# Patient Record
Sex: MALE | Race: WHITE | NOT HISPANIC OR LATINO | URBAN - METROPOLITAN AREA
[De-identification: names, ages, dates, MRNs, and addresses within clinical notes are randomized per-mention and may not be internally consistent; named-entity substitution may affect disease eponyms.]

---
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
## 2021-02-25 ENCOUNTER — OFFICE VISIT (OUTPATIENT)
Dept: PEDIATRICS CLINIC | Facility: CLINIC | Age: 21
End: 2021-02-25
Payer: COMMERCIAL

## 2021-02-25 VITALS
HEART RATE: 82 BPM | BODY MASS INDEX: 33.6 KG/M2 | WEIGHT: 240 LBS | SYSTOLIC BLOOD PRESSURE: 120 MMHG | TEMPERATURE: 98.2 F | DIASTOLIC BLOOD PRESSURE: 72 MMHG | HEIGHT: 71 IN | RESPIRATION RATE: 18 BRPM

## 2021-02-25 DIAGNOSIS — Z00.01 ENCOUNTER FOR ROUTINE ADULT HEALTH EXAMINATION WITH ABNORMAL FINDINGS: Primary | ICD-10-CM

## 2021-02-25 DIAGNOSIS — R63.5 ABNORMAL WEIGHT GAIN: ICD-10-CM

## 2021-02-25 DIAGNOSIS — E66.9 OBESITY (BMI 30.0-34.9): ICD-10-CM

## 2021-02-25 DIAGNOSIS — Z11.3 SCREEN FOR STD (SEXUALLY TRANSMITTED DISEASE): ICD-10-CM

## 2021-02-25 DIAGNOSIS — Z23 NEED FOR VACCINATION: ICD-10-CM

## 2021-02-25 PROBLEM — Z72.820 LACK OF ADEQUATE SLEEP: Status: RESOLVED | Noted: 2019-06-03 | Resolved: 2021-02-25

## 2021-02-25 PROBLEM — Z00.121 ENCOUNTER FOR ROUTINE CHILD HEALTH EXAMINATION WITH ABNORMAL FINDINGS: Status: ACTIVE | Noted: 2019-06-03

## 2021-02-25 PROBLEM — J34.0 ABSCESS OF NOSE: Status: RESOLVED | Noted: 2020-08-28 | Resolved: 2021-02-25

## 2021-02-25 PROCEDURE — 90471 IMMUNIZATION ADMIN: CPT

## 2021-02-25 PROCEDURE — 90651 9VHPV VACCINE 2/3 DOSE IM: CPT

## 2021-02-25 PROCEDURE — 99395 PREV VISIT EST AGE 18-39: CPT | Performed by: PEDIATRICS

## 2021-02-25 PROCEDURE — 3008F BODY MASS INDEX DOCD: CPT | Performed by: PEDIATRICS

## 2021-02-25 PROCEDURE — 3725F SCREEN DEPRESSION PERFORMED: CPT | Performed by: PEDIATRICS

## 2021-02-25 PROCEDURE — 1036F TOBACCO NON-USER: CPT | Performed by: PEDIATRICS

## 2021-02-25 NOTE — PATIENT INSTRUCTIONS
Human Papillomavirus Vaccine (Cervarix, Gardasil, Gardasil 9) - (By injection)   Why this medicine is used:   Helps prevent genital warts and cancer of the anus, cervix, vagina, vulva, oropharyngeal (mouth and throat), or head and neck, which may be caused by human papillomavirus (HPV)  Contact a nurse or doctor right away if you have:  · Lightheadedness, dizziness, or fainting     Common side effects:  · Fever, headache, fatigue  © Copyright [employer] Information is for End User's use only and may not be sold, redistributed or otherwise used for commercial purposes

## 2021-02-25 NOTE — PROGRESS NOTES
Subjective:     Alana Edwards is a 24 y o  male who is brought in for this well child visit  History provided by: patient    Current Issues:  Current concerns: none  Well Child Assessment:  History provided by: The patient  Megan Hamm lives with his mother and sister  (No interval problems)     Nutrition  Food source: Regular diet  Dental  The patient has a dental home  The patient brushes teeth regularly  The patient flosses regularly  Elimination  (No elimination problems)   Sleep  The patient does not snore  There are no sleep problems  Safety  There is no smoking in the home  School  Grade level in school: In college, online studies  There are no signs of learning disabilities  Child is doing well in school  Screening  There are no risk factors for hearing loss  There are no risk factors for anemia  There are risk factors for dyslipidemia (Rapid weight gain)  There are no risk factors for vision problems  There are risk factors related to diet  There are risk factors for sexually transmitted infections (Sexually active, protected sex, last intercourse two months ago, No concerns with possible sexually transmitted diseases contact)  There are no risk factors related to alcohol  There are no risk factors related to emotions  There are no risk factors related to drugs  There are no risk factors related to tobacco    Social  Sibling interactions are good  The following portions of the patient's history were reviewed and updated as appropriate: allergies, current medications, past family history, past medical history, past social history, past surgical history and problem list           Objective:       Vitals:    02/25/21 1628   BP: 120/72   Pulse: 82   Resp: 18   Temp: 98 2 °F (36 8 °C)   TempSrc: Tympanic   Weight: 109 kg (240 lb)   Height: 5' 11" (1 803 m)     Growth parameters are noted and are not appropriate for age      Wt Readings from Last 1 Encounters:   02/25/21 109 kg (240 lb) (99 %, Z= 2 24)* * Growth percentiles are based on [facility] [place][note 2] (Boys, 2-20 Years) data  Ht Readings from Last 1 Encounters:   02/25/21 5' 11" (1 803 m) (69 %, Z= 0 50)*     * Growth percentiles are based on [facility] [place][note 2] (Boys, 2-20 Years) data  Body mass index is 33 47 kg/m²  Vitals:    02/25/21 1628   BP: 120/72   Pulse: 82   Resp: 18   Temp: 98 2 °F (36 8 °C)   TempSrc: Tympanic   Weight: 109 kg (240 lb)   Height: 5' 11" (1 803 m)        Hearing Screening    125Hz 250Hz 500Hz 1000Hz 2000Hz 3000Hz 4000Hz 6000Hz 8000Hz   Right ear:     25 25 25 25 25   Left ear:     25 25 25 25 25      Visual Acuity Screening    Right eye Left eye Both eyes   Without correction:   20/20   With correction:          Physical Exam  Vitals signs and nursing note reviewed  Constitutional:       General: He is not in acute distress  Appearance: Normal appearance  He is well-developed  He is not diaphoretic  HENT:      Head: Normocephalic and atraumatic  Right Ear: Tympanic membrane and external ear normal  No drainage  Left Ear: Tympanic membrane and external ear normal  No drainage  Nose: Nose normal  No nasal deformity  Mouth/Throat:      Pharynx: No oropharyngeal exudate or posterior oropharyngeal erythema  Tonsils: No tonsillar abscesses  Eyes:      General: Lids are normal  No scleral icterus  Right eye: No discharge  Left eye: No discharge  Conjunctiva/sclera: Conjunctivae normal       Pupils: Pupils are equal, round, and reactive to light  Neck:      Musculoskeletal: Normal range of motion and neck supple  Cardiovascular:      Rate and Rhythm: Normal rate and regular rhythm  Heart sounds: Normal heart sounds, S1 normal and S2 normal  No murmur  Pulmonary:      Effort: Pulmonary effort is normal  No respiratory distress  Breath sounds: Normal breath sounds  Abdominal:      General: Bowel sounds are normal       Palpations: Abdomen is soft   There is no hepatomegaly or splenomegaly  Tenderness: There is no abdominal tenderness  Genitourinary:     Comments:  Patient has no current complaints, exam deferred  Musculoskeletal: Normal range of motion  General: No tenderness or deformity  Comments:  No scoliosis   Lymphadenopathy:      Head:      Right side of head: No tonsillar adenopathy  Left side of head: No tonsillar adenopathy  Skin:     General: Skin is warm and dry  Findings: No rash  Rash is not pustular  Neurological:      Mental Status: He is alert and oriented to person, place, and time  Cranial Nerves: No cranial nerve deficit  Psychiatric:         Behavior: Behavior normal          Thought Content: Thought content normal          Judgment: Judgment normal            Assessment:     Well adolescent  1  Encounter for routine adult health examination with abnormal findings     2  Need for vaccination  HPV VACCINE 9 VALENT IM    CANCELED: influenza vaccine, quadrivalent, 0 5 mL, preservative-free, for adult and pediatric patients 6 mos+ (AFLURIA, FLUARIX, FLULAVAL, FLUZONE)   3  Obesity (BMI 30 0-34 9)     4  Abnormal weight gain  Lipid panel    Hemoglobin A1C   5  Screen for STD (sexually transmitted disease)  HIV 1/2 ANTIGEN/ANTIBODY (4TH GENERATION) W REFLEX [facility]        Plan:         1  Anticipatory guidance discussed  Specific topics reviewed: importance of regular dental care, importance of regular exercise, importance of varied diet, minimize junk food, puberty and sex; STD and pregnancy prevention  BMI Counseling: Body mass index is 33 47 kg/m²  The BMI is above normal  Nutrition recommendations include decreasing portion sizes, encouraging healthy choices of fruits and vegetables, limiting drinks that contain sugar, moderation in carbohydrate intake, increasing intake of lean protein and reducing intake of saturated and trans fat  Exercise recommendations include moderate physical activity 150 minutes/week   No pharmacotherapy was ordered  Labs ordered to evaluate metabolic status of the patient          2  Development: appropriate for age    1  Immunizations today: per orders  Vaccine Counseling: Discussed with: Ped parent/guardian: The patient  The benefits, contraindication and side effects for the following vaccines were reviewed: Immunization component list: Gardisil  Total number of components reveiwed:1    the patient refused influenza immunization  He is completely aware of the consequences of his decision    4  Follow-up visit in 1 year for next well child visit, or sooner as needed

## 2021-03-02 ENCOUNTER — APPOINTMENT (OUTPATIENT)
Dept: LAB | Facility: MEDICAL CENTER | Age: 21
End: 2021-03-02
Payer: COMMERCIAL

## 2021-03-02 ENCOUNTER — LAB (OUTPATIENT)
Dept: LAB | Facility: MEDICAL CENTER | Age: 21
End: 2021-03-02
Payer: COMMERCIAL

## 2021-03-02 DIAGNOSIS — Z11.3 SCREEN FOR STD (SEXUALLY TRANSMITTED DISEASE): ICD-10-CM

## 2021-03-02 DIAGNOSIS — R63.5 ABNORMAL WEIGHT GAIN: ICD-10-CM

## 2021-03-02 LAB
CHOLEST SERPL-MCNC: 181 MG/DL (ref 50–200)
HDLC SERPL-MCNC: 40 MG/DL
LDLC SERPL CALC-MCNC: 110 MG/DL (ref 0–100)
NONHDLC SERPL-MCNC: 141 MG/DL
TRIGL SERPL-MCNC: 157 MG/DL

## 2021-03-02 PROCEDURE — 80061 LIPID PANEL: CPT

## 2021-03-02 PROCEDURE — 36415 COLL VENOUS BLD VENIPUNCTURE: CPT

## 2021-03-02 PROCEDURE — 87389 HIV-1 AG W/HIV-1&-2 AB AG IA: CPT

## 2021-03-02 PROCEDURE — 83036 HEMOGLOBIN GLYCOSYLATED A1C: CPT

## 2021-03-03 ENCOUNTER — TELEPHONE (OUTPATIENT)
Dept: PEDIATRICS CLINIC | Facility: CLINIC | Age: 21
End: 2021-03-03

## 2021-03-03 LAB
EST. AVERAGE GLUCOSE BLD GHB EST-MCNC: 97 MG/DL
HBA1C MFR BLD: 5 %
HIV 1+2 AB+HIV1 P24 AG SERPL QL IA: NORMAL

## 2021-03-03 NOTE — TELEPHONE ENCOUNTER
----- Message from Laura Levy MD sent at 3/3/2021  2:26 PM EST -----   Triglycerides and LDL (bad cholesterol) are elevated slightly  Healthy lifestyle,  physical activity, healthy diet as recommended    Repeat labs in one year or as needed

## 2022-10-12 PROBLEM — Z00.01 ENCOUNTER FOR ROUTINE ADULT HEALTH EXAMINATION WITH ABNORMAL FINDINGS: Status: RESOLVED | Noted: 2019-06-03 | Resolved: 2022-10-12

## 2023-01-23 ENCOUNTER — OFFICE VISIT (OUTPATIENT)
Dept: SURGERY | Facility: CLINIC | Age: 23
End: 2023-01-23

## 2023-01-23 VITALS
BODY MASS INDEX: 32.6 KG/M2 | SYSTOLIC BLOOD PRESSURE: 100 MMHG | HEIGHT: 73 IN | TEMPERATURE: 97.7 F | OXYGEN SATURATION: 99 % | DIASTOLIC BLOOD PRESSURE: 60 MMHG | RESPIRATION RATE: 18 BRPM | HEART RATE: 72 BPM | WEIGHT: 246 LBS

## 2023-01-23 DIAGNOSIS — D17.1 LIPOMA OF SKIN OF ABDOMEN: Primary | ICD-10-CM

## 2023-01-23 NOTE — LETTER
January 23, 2023     [facility], [address] [place] [address],[address] [id number]    Patient: Henry Hodges   YOB: 2000   Date of Visit: 1/23/2023       Dear Dr Nancy Tejada: Thank you for referring Regina Other to me for evaluation  Below are my notes for this consultation  If you have questions, please do not hesitate to call me  I look forward to following your patient along with you  Sincerely,        Shilpi Fraga MD        CC: No Recipients  Shilpi Fraga MD  1/23/2023  9:33 AM  Sign when Signing Visit  Assessment/Plan:    Lipoma of skin of abdomen  Patient is a pleasant 76-year-old male presenting with a symptomatic lipoma in the right upper quadrant of his abdominal wall for which definitive treatment by excisional biopsy is now decayed  Additionally he notes a second lipoma on the posterior right flank  This is deep and would be difficult to excise in the office  Together we decided to not pursue excision in the office at the time of today's appointment  The technical details of excisional biopsy of the right upper quadrant abdominal wall lipoma reviewed in the presence of his mother  Specific risks related to local anesthesia bleeding infection and recurrence were all reviewed and informed verbal consent obtained to proceed  Subjective:     Patient ID: Henry Hodges is a 25 y o  male  Patient came in today for a lump on his right abd and back  Patient states the lumps only bother him when touched or pushed on  Patient is unsure how long he has these lumps but notice the lumps a few months ago  No fevers or chills Marya MACKEY MA      The following portions of the patient's history were reviewed and updated as appropriate: allergies, current medications, past family history, past medical history, past social history, past surgical history and problem list     Review of Systems   Constitutional: Negative for chills and fever     HENT: Negative for ear pain and sore throat  Eyes: Negative for pain and visual disturbance  Respiratory: Negative for cough and shortness of breath  Cardiovascular: Negative for chest pain and palpitations  Gastrointestinal: Negative for abdominal pain and vomiting  Genitourinary: Negative for dysuria and hematuria  Musculoskeletal: Negative for arthralgias and back pain  Skin: Negative for color change and rash  Neurological: Negative for seizures and syncope  All other systems reviewed and are negative  Objective:      /60 (BP Location: Left arm, Patient Position: Sitting, Cuff Size: Standard)   Pulse 72   Temp 97 7 °F (36 5 °C) (Temporal)   Resp 18   Ht 6' 1" (1 854 m)   Wt 112 kg (246 lb)   SpO2 99%   BMI 32 46 kg/m²           Physical Exam  Vitals and nursing note reviewed  Constitutional:       Appearance: He is well-developed  HENT:      Head: Normocephalic and atraumatic  Eyes:      Conjunctiva/sclera: Conjunctivae normal       Pupils: Pupils are equal, round, and reactive to light  Cardiovascular:      Rate and Rhythm: Normal rate and regular rhythm  Pulmonary:      Effort: Pulmonary effort is normal       Breath sounds: Normal breath sounds  Abdominal:      General: Bowel sounds are normal       Palpations: Abdomen is soft  Musculoskeletal:         General: Normal range of motion  Cervical back: Normal range of motion and neck supple  Skin:     General: Skin is warm and dry  Comments: 10 mm right upper quadrant abdominal wall lipoma   Neurological:      Mental Status: He is alert and oriented to person, place, and time  Psychiatric:         Behavior: Behavior normal          Thought Content: Thought content normal          Judgment: Judgment normal       Biopsy    Date/Time: 1/23/2023 9:32 AM  Performed by: Annie Plasencia MD  Authorized by: Annie Plasencia MD   Universal Protocol:  Consent: Verbal consent obtained    Risks and benefits: risks, benefits and alternatives were discussed  Consent given by: patient  Time out: Immediately prior to procedure a "time out" was called to verify the correct patient, procedure, equipment, support staff and site/side marked as required  Timeout called at: 1/23/2023 9:32 AM   Patient understanding: patient states understanding of the procedure being performed  Patient consent: the patient's understanding of the procedure matches consent given  Site marked: the operative site was marked  Patient identity confirmed: verbally with patient      Procedure Details - Lesion Biopsy:     Biopsy tissue type: superficial soft tissue  Trunk location: Right upper quadrant abdominal wall  Malignancy: benign lesion      Destruction method comment:  Excisional biopsy     Procedure note: With informed verbal consent under sterile conditions using aseptic technique using 0 75% bupivacaine the right upper quadrant abdominal wall lipoma was sharply excised with a 15 blade and the wound closed primarily with 3 vertical mattress sutures of 3-0 nylon

## 2023-01-23 NOTE — PROGRESS NOTES
Assessment/Plan:    Lipoma of skin of abdomen  Patient is a pleasant 70-year-old male presenting with a symptomatic lipoma in the right upper quadrant of his abdominal wall for which definitive treatment by excisional biopsy is now decayed  Additionally he notes a second lipoma on the posterior right flank  This is deep and would be difficult to excise in the office  Together we decided to not pursue excision in the office at the time of today's appointment  The technical details of excisional biopsy of the right upper quadrant abdominal wall lipoma reviewed in the presence of his mother  Specific risks related to local anesthesia bleeding infection and recurrence were all reviewed and informed verbal consent obtained to proceed  Subjective:      Patient ID: Edwena Felty is a 25 y o  male  Patient came in today for a lump on his right abd and back  Patient states the lumps only bother him when touched or pushed on  Patient is unsure how long he has these lumps but notice the lumps a few months ago  No fevers or chills Marya MACKEY MA      The following portions of the patient's history were reviewed and updated as appropriate: allergies, current medications, past family history, past medical history, past social history, past surgical history and problem list     Review of Systems   Constitutional: Negative for chills and fever  HENT: Negative for ear pain and sore throat  Eyes: Negative for pain and visual disturbance  Respiratory: Negative for cough and shortness of breath  Cardiovascular: Negative for chest pain and palpitations  Gastrointestinal: Negative for abdominal pain and vomiting  Genitourinary: Negative for dysuria and hematuria  Musculoskeletal: Negative for arthralgias and back pain  Skin: Negative for color change and rash  Neurological: Negative for seizures and syncope  All other systems reviewed and are negative          Objective:      /60 (BP Location: Left arm, Patient Position: Sitting, Cuff Size: Standard)   Pulse 72   Temp 97 7 °F (36 5 °C) (Temporal)   Resp 18   Ht 6' 1" (1 854 m)   Wt 112 kg (246 lb)   SpO2 99%   BMI 32 46 kg/m²            Physical Exam  Vitals and nursing note reviewed  Constitutional:       Appearance: He is well-developed  HENT:      Head: Normocephalic and atraumatic  Eyes:      Conjunctiva/sclera: Conjunctivae normal       Pupils: Pupils are equal, round, and reactive to light  Cardiovascular:      Rate and Rhythm: Normal rate and regular rhythm  Pulmonary:      Effort: Pulmonary effort is normal       Breath sounds: Normal breath sounds  Abdominal:      General: Bowel sounds are normal       Palpations: Abdomen is soft  Musculoskeletal:         General: Normal range of motion  Cervical back: Normal range of motion and neck supple  Skin:     General: Skin is warm and dry  Comments: 10 mm right upper quadrant abdominal wall lipoma   Neurological:      Mental Status: He is alert and oriented to person, place, and time  Psychiatric:         Behavior: Behavior normal          Thought Content: Thought content normal          Judgment: Judgment normal        Biopsy    Date/Time: 1/23/2023 9:32 AM  Performed by: Brina Vigil MD  Authorized by: Brina Vigil MD   Universal Protocol:  Consent: Verbal consent obtained  Risks and benefits: risks, benefits and alternatives were discussed  Consent given by: patient  Time out: Immediately prior to procedure a "time out" was called to verify the correct patient, procedure, equipment, support staff and site/side marked as required    Timeout called at: 1/23/2023 9:32 AM   Patient understanding: patient states understanding of the procedure being performed  Patient consent: the patient's understanding of the procedure matches consent given  Site marked: the operative site was marked  Patient identity confirmed: verbally with patient      Procedure Details - Lesion Biopsy:     Biopsy tissue type: superficial soft tissue  Trunk location: Right upper quadrant abdominal wall  Malignancy: benign lesion      Destruction method comment:  Excisional biopsy     Procedure note: With informed verbal consent under sterile conditions using aseptic technique using 0 75% bupivacaine the right upper quadrant abdominal wall lipoma was sharply excised with a 15 blade and the wound closed primarily with 3 vertical mattress sutures of 3-0 nylon

## 2023-01-30 ENCOUNTER — TELEPHONE (OUTPATIENT)
Dept: SURGERY | Facility: CLINIC | Age: 23
End: 2023-01-30

## 2023-02-06 ENCOUNTER — OFFICE VISIT (OUTPATIENT)
Dept: SURGERY | Facility: CLINIC | Age: 23
End: 2023-02-06

## 2023-02-06 VITALS — TEMPERATURE: 97.6 F

## 2023-02-06 DIAGNOSIS — D17.1 LIPOMA OF SKIN OF ABDOMEN: Primary | ICD-10-CM

## 2023-02-06 NOTE — ASSESSMENT & PLAN NOTE
Doing well post-op  Incision healing well  Sutures removed  Copy of benign path provided  Will see back as needed

## 2023-02-06 NOTE — PROGRESS NOTES
Post-Op Note - General Surgery   Maria L Perez 25 y o  male MRN: [id number]  Encounter: [id number]    Assessment/Plan    Lipoma of skin of abdomen  Doing well post-op  Incision healing well  Sutures removed  Copy of benign path provided  Will see back as needed  Diagnoses and all orders for this visit:    Lipoma of skin of abdomen        Subjective      Chief Complaint   Patient presents with   • Post-op     s/p suture removal 2wk abd     Patient came in today for suture removal after having a Lipoma of skin of abdomen removed on 01/23/23  Patient states the inc is healed and denies drainage or fevers/chills  Marya MACKEY MA     Review of Systems   Skin: Positive for wound  The following portions of the patient's history were reviewed and updated as appropriate: allergies, current medications, past family history, past medical history, past social history, past surgical history and problem list     Objective      Temperature 97 6 °F (36 4 °C), temperature source Temporal    Physical Exam  Vitals and nursing note reviewed  Constitutional:       General: He is not in acute distress  Appearance: He is well-developed  He is not diaphoretic  HENT:      Head: Normocephalic and atraumatic  Eyes:      Conjunctiva/sclera: Conjunctivae normal       Pupils: Pupils are equal, round, and reactive to light  Pulmonary:      Effort: No respiratory distress  Musculoskeletal:         General: Normal range of motion  Cervical back: Normal range of motion  Skin:     General: Skin is warm and dry  Capillary Refill: Capillary refill takes less than 2 seconds  Comments: Incision C/D/I, well healed  Neurological:      Mental Status: He is alert and oriented to person, place, and time     Psychiatric:         Behavior: Behavior normal          Signature:  Guido Chambers PA-C  Date: 2/6/2023 Time: 11:01 AM

## 2023-02-08 ENCOUNTER — TELEPHONE (OUTPATIENT)
Dept: PEDIATRICS CLINIC | Facility: CLINIC | Age: 23
End: 2023-02-08

## 2023-03-20 NOTE — TELEPHONE ENCOUNTER
03/20/23 12:16 PM     The office's request has been received, reviewed, and the patient chart updated  The PCP has successfully been removed with a patient attribution note  This message will now be completed      Thank you  Amiel Hodgkin

## 2023-05-24 ENCOUNTER — OFFICE VISIT (OUTPATIENT)
Dept: FAMILY MEDICINE CLINIC | Facility: CLINIC | Age: 23
End: 2023-05-24

## 2023-05-24 VITALS
OXYGEN SATURATION: 99 % | WEIGHT: 246 LBS | SYSTOLIC BLOOD PRESSURE: 138 MMHG | HEIGHT: 73 IN | TEMPERATURE: 96.3 F | BODY MASS INDEX: 32.6 KG/M2 | HEART RATE: 83 BPM | DIASTOLIC BLOOD PRESSURE: 68 MMHG

## 2023-05-24 DIAGNOSIS — Z23 ENCOUNTER FOR IMMUNIZATION: ICD-10-CM

## 2023-05-24 DIAGNOSIS — Z00.00 ANNUAL PHYSICAL EXAM: Primary | ICD-10-CM

## 2023-05-24 DIAGNOSIS — Z11.59 NEED FOR HEPATITIS C SCREENING TEST: ICD-10-CM

## 2023-05-24 NOTE — PROGRESS NOTES
[facility]    NAME: Jameson Lee  AGE: 25 y o  SEX: male  : 2000     DATE: 2023     Assessment and Plan:     Problem List Items Addressed This Visit    None  Visit Diagnoses     Annual physical exam    -  Primary    Need for hepatitis C screening test        Relevant Orders    Hepatitis C Antibody    Encounter for immunization        Relevant Orders    TDAP VACCINE GREATER THAN OR EQUAL TO 8YO IM (Completed)    BMI 34 0-34 9,adult        Relevant Orders    CBC and differential    Basic metabolic panel    Lipid Panel with Direct LDL reflex    TSH, 3rd generation with Free T4 reflex          Immunizations and preventive care screenings were discussed with patient today  Appropriate education was printed on patient's after visit summary  Counseling:  Alcohol/drug use: discussed moderation in alcohol intake, the recommendations for healthy alcohol use, and avoidance of illicit drug use  · Dental Health: discussed importance of regular tooth brushing, flossing, and dental visits  BMI Counseling: Body mass index is 32 46 kg/m²  The BMI is above normal  Nutrition recommendations include decreasing portion sizes, encouraging healthy choices of fruits and vegetables, decreasing fast food intake, consuming healthier snacks, limiting drinks that contain sugar, moderation in carbohydrate intake, increasing intake of lean protein, reducing intake of saturated and trans fat and reducing intake of cholesterol  Exercise recommendations include moderate physical activity 150 minutes/week, vigorous physical activity 75 minutes/week, obtaining a gym membership and strength training exercises  Rationale for BMI follow-up plan is due to patient being overweight or obese  Depression Screening and Follow-up Plan: Patient was screened for depression during today's encounter  They screened negative with a PHQ-2 score of 0          No follow-ups on file  Chief Complaint:     Chief Complaint   Patient presents with   • New Patient Visit     [facility] care, needs form for work filled out      History of Present Illness:     Adult Annual Physical   Patient here for a comprehensive physical exam  The patient reports no problems  Diet and Physical Activity  · Diet/Nutrition: well balanced diet  · Exercise: no formal exercise  Depression Screening  PHQ-2/9 Depression Screening    Little interest or pleasure in doing things: 0 - not at all  Feeling down, depressed, or hopeless: 0 - not at all  PHQ-2 Score: 0  PHQ-2 Interpretation: Negative depression screen       General Health  · Sleep: sleeps well  · Hearing: normal - bilateral   · Vision: no vision problems  · Dental: regular dental visits  [facility]  · History of STDs?: yes  Review of Systems:     Review of Systems   Constitutional: Negative for activity change, appetite change, chills, fatigue and fever  HENT: Negative for congestion, dental problem, drooling, ear discharge, ear pain, facial swelling, postnasal drip, rhinorrhea and sinus pain  Eyes: Negative for photophobia, pain, discharge and itching  Respiratory: Negative for apnea, cough, chest tightness and shortness of breath  Cardiovascular: Negative for chest pain and leg swelling  Gastrointestinal: Negative for abdominal distention, abdominal pain, anal bleeding, constipation, diarrhea and nausea  Endocrine: Negative for cold intolerance, heat intolerance and polydipsia  Genitourinary: Negative for difficulty urinating  Musculoskeletal: Negative for arthralgias, gait problem, joint swelling and myalgias  Skin: Negative for color change and pallor  Allergic/Immunologic: Negative for immunocompromised state  Neurological: Negative for dizziness, seizures, facial asymmetry, weakness, light-headedness, numbness and headaches     Psychiatric/Behavioral: Negative for agitation, behavioral problems, "confusion, decreased concentration and dysphoric mood  All other systems reviewed and are negative  Past Medical History:     Past Medical History:   Diagnosis Date   • Irritable bowel syndrome with diarrhea     resolved 12/09/2016      Past Surgical History:     Past Surgical History:   Procedure Laterality Date   • CIRCUMCISION        Social History:     Social History     Socioeconomic History   • Marital status: Single     Spouse name: None   • Number of children: None   • Years of education: None   • Highest education level: None   Occupational History   • None   Tobacco Use   • Smoking status: Never   • Smokeless tobacco: Never   Vaping Use   • Vaping Use: Never used   Substance and Sexual Activity   • Alcohol use: Yes     Comment: socially   • Drug use: Not Currently   • Sexual activity: Yes   Other Topics Concern   • None   Social History Narrative    Lives with mother(single mother)    Lives with sister    Currently in school    School sports    No secondhand smoke exposure    Well balanced diet          Social Determinants of Health     Financial Resource Strain: Not on file   Food Insecurity: Not on file   Transportation Needs: Not on file   Physical Activity: Not on file   Stress: Not on file   Social Connections: Not on file   Intimate Partner Violence: Not on file   Housing Stability: Not on file      Family History:     Family History   Problem Relation Age of Onset   • No Known Problems Mother    • No Known Problems Father    • No Known Problems Sister       Current Medications:     No current outpatient medications on file  No current facility-administered medications for this visit  Allergies:      Allergies   Allergen Reactions   • Sulfa Antibiotics Hives      Physical Exam:     /68 (BP Location: Left arm, Patient Position: Sitting, Cuff Size: Adult)   Pulse 83   Temp (!) 96 3 °F (35 7 °C) (Tympanic)   Ht 6' 1\" (1 854 m)   Wt 112 kg (246 lb)   SpO2 99%   BMI 32 46 kg/m² " Physical Exam     Chayo Ojeda MD   [address] [facility]

## 2023-11-24 ENCOUNTER — RA CDI HCC (OUTPATIENT)
Dept: OTHER | Facility: HOSPITAL | Age: 23
End: 2023-11-24

## 2023-11-24 NOTE — PROGRESS NOTES
[address] [facility] coding opportunities       Chart reviewed, no opportunity found: CHART REVIEWED, NO OPPORTUNITY FOUND        Patients Insurance        Commercial Insurance: [employer]